# Patient Record
Sex: FEMALE | Race: WHITE | NOT HISPANIC OR LATINO | URBAN - METROPOLITAN AREA
[De-identification: names, ages, dates, MRNs, and addresses within clinical notes are randomized per-mention and may not be internally consistent; named-entity substitution may affect disease eponyms.]

---

## 2017-06-09 ENCOUNTER — APPOINTMENT (OUTPATIENT)
Dept: RADIOLOGY | Facility: MEDICAL CENTER | Age: 32
End: 2017-06-09
Attending: EMERGENCY MEDICINE
Payer: COMMERCIAL

## 2017-06-09 ENCOUNTER — HOSPITAL ENCOUNTER (EMERGENCY)
Facility: MEDICAL CENTER | Age: 32
End: 2017-06-09
Attending: EMERGENCY MEDICINE
Payer: COMMERCIAL

## 2017-06-09 VITALS
HEART RATE: 72 BPM | SYSTOLIC BLOOD PRESSURE: 112 MMHG | DIASTOLIC BLOOD PRESSURE: 67 MMHG | BODY MASS INDEX: 21.97 KG/M2 | HEIGHT: 65 IN | OXYGEN SATURATION: 99 % | WEIGHT: 131.84 LBS | TEMPERATURE: 97.8 F | RESPIRATION RATE: 16 BRPM

## 2017-06-09 DIAGNOSIS — O20.0 ABORTION, THREATENED: ICD-10-CM

## 2017-06-09 LAB
ACTION RH IMMUNE GLOB 8505RHG: NORMAL
APPEARANCE UR: CLEAR
B-HCG SERPL-ACNC: ABNORMAL MIU/ML (ref 0–5)
BASOPHILS # BLD AUTO: 0.2 % (ref 0–1.8)
BASOPHILS # BLD: 0.02 K/UL (ref 0–0.12)
BILIRUB UR QL STRIP.AUTO: NEGATIVE
COLOR UR: YELLOW
EOSINOPHIL # BLD AUTO: 0.11 K/UL (ref 0–0.51)
EOSINOPHIL NFR BLD: 1.3 % (ref 0–6.9)
ERYTHROCYTE [DISTWIDTH] IN BLOOD BY AUTOMATED COUNT: 35.9 FL (ref 35.9–50)
GLUCOSE UR STRIP.AUTO-MCNC: NEGATIVE MG/DL
HCT VFR BLD AUTO: 37.3 % (ref 37–47)
HGB BLD-MCNC: 12.9 G/DL (ref 12–16)
IMM GRANULOCYTES # BLD AUTO: 0.02 K/UL (ref 0–0.11)
IMM GRANULOCYTES NFR BLD AUTO: 0.2 % (ref 0–0.9)
KETONES UR STRIP.AUTO-MCNC: NEGATIVE MG/DL
LEUKOCYTE ESTERASE UR QL STRIP.AUTO: NEGATIVE
LYMPHOCYTES # BLD AUTO: 2.07 K/UL (ref 1–4.8)
LYMPHOCYTES NFR BLD: 24.9 % (ref 22–41)
MCH RBC QN AUTO: 29.8 PG (ref 27–33)
MCHC RBC AUTO-ENTMCNC: 34.6 G/DL (ref 33.6–35)
MCV RBC AUTO: 86.1 FL (ref 81.4–97.8)
MICRO URNS: NORMAL
MONOCYTES # BLD AUTO: 0.68 K/UL (ref 0–0.85)
MONOCYTES NFR BLD AUTO: 8.2 % (ref 0–13.4)
NEUTROPHILS # BLD AUTO: 5.41 K/UL (ref 2–7.15)
NEUTROPHILS NFR BLD: 65.2 % (ref 44–72)
NITRITE UR QL STRIP.AUTO: NEGATIVE
NRBC # BLD AUTO: 0 K/UL
NRBC BLD AUTO-RTO: 0 /100 WBC
NUMBER OF RH DOSES IND 8505RD: 1
PH UR STRIP.AUTO: 6.5 [PH]
PLATELET # BLD AUTO: 277 K/UL (ref 164–446)
PMV BLD AUTO: 9.9 FL (ref 9–12.9)
PROT UR QL STRIP: NEGATIVE MG/DL
RBC # BLD AUTO: 4.33 M/UL (ref 4.2–5.4)
RBC UR QL AUTO: NEGATIVE
RH BLD: NORMAL
SP GR UR STRIP.AUTO: 1.01
WBC # BLD AUTO: 8.3 K/UL (ref 4.8–10.8)

## 2017-06-09 PROCEDURE — 96374 THER/PROPH/DIAG INJ IV PUSH: CPT

## 2017-06-09 PROCEDURE — 36415 COLL VENOUS BLD VENIPUNCTURE: CPT

## 2017-06-09 PROCEDURE — 76817 TRANSVAGINAL US OBSTETRIC: CPT

## 2017-06-09 PROCEDURE — 86901 BLOOD TYPING SEROLOGIC RH(D): CPT

## 2017-06-09 PROCEDURE — 84702 CHORIONIC GONADOTROPIN TEST: CPT

## 2017-06-09 PROCEDURE — 85025 COMPLETE CBC W/AUTO DIFF WBC: CPT

## 2017-06-09 PROCEDURE — 96372 THER/PROPH/DIAG INJ SC/IM: CPT

## 2017-06-09 PROCEDURE — 99284 EMERGENCY DEPT VISIT MOD MDM: CPT

## 2017-06-09 PROCEDURE — 81003 URINALYSIS AUTO W/O SCOPE: CPT

## 2017-06-09 NOTE — ED AVS SNAPSHOT
6/9/2017    Barbara [place] [address]    Dear Barbara:    [facility] wants to ensure your discharge home is safe and you or your loved ones have had all of your questions answered regarding your care after you leave the hospital.    Below is a list of resources and contact information should you have any questions regarding your hospital stay, follow-up instructions, or active medical symptoms.    Questions or Concerns Regarding… Contact   Medical Questions Related to Your Discharge  (7 days a week, 8am-5pm) Contact a Nurse Care Coordinator   [phone]   Medical Questions Not Related to Your Discharge  (24 hours a day / 7 days a week)  Contact the Nurse Health Line   [phone]    Medications or Discharge Instructions Refer to your discharge packet   or contact your [facility] Primary Care Provider   [phone]   Follow-up Appointment(s) Schedule your appointment via [employer]   or contact Scheduling [phone]   Billing Review your statement via [employer]  or contact Billing [phone]   Medical Records Review your records via [employer]   or contact Medical Records [phone]     You may receive a telephone call within two days of discharge. This call is to make certain you understand your discharge instructions and have the opportunity to have any questions answered. You can also easily access your medical information, test results and upcoming appointments via the [employer] free online health management tool. You can learn more and sign up at [email or web address]. For assistance setting up your [employer] account, please call [phone].    Once again, we want to ensure your discharge home is safe and that you have a clear understanding of any next steps in your care. If you have any questions or concerns, please do not hesitate to contact us, we are here for you. Thank you for choosing [facility] for your healthcare needs.    Sincerely,    Your [facility] Healthcare Team

## 2017-06-09 NOTE — ED PROVIDER NOTES
"ED Provider Note    CHIEF COMPLAINT  Chief Complaint   Patient presents with   • Pregnancy     9 weeks,    • Vaginal Bleeding     since last night       [facility]  Barbara Salinas is a 31 y.o. female who presents for evaluation of pregnancy and bleeding.  The patient is  Ab0 whose LMP was in April, though she states it was quite light for normal.  The patient took a pregnancy test at home which was positive.  The patient also had a quantitative beta hCG performed on 5/15/17 of approximately 2250.  The patient states over last couple days she's been having small amounts of vaginal bleeding.  The bleeding is worse after she has bowel movements that she denies any rectal bleeding.  She has had polyuria but no dysuria.  She denies: Fever, chills, URI symptoms, cardiorespiratory symptoms, gastrointestinal symptoms.  No other acute symptomatology or complaints.    REVIEW OF SYSTEMS  See [facility] for further details.  No history of: Hypertension, diabetes, cardiopulmonary disorder, gastrointestinal disorder, gynecological disorders.  She relates a history of asthma as a child but none for several years.  She also has a remote history of mild IBS.  She has lactose intolerance.  All other systems negative.    PAST MEDICAL HISTORY  None    FAMILY HISTORY  No family history on file.    SOCIAL HISTORY  Nonsmoker; denies drug use;    SURGICAL HISTORY  No past surgical history on file.    CURRENT MEDICATIONS  See nurses notes    ALLERGIES  Allergies   Allergen Reactions   • Codeine        PHYSICAL EXAM  VITAL SIGNS: /65 mmHg  Pulse 84  Temp(Src) 36.8 °C (98.2 °F)  Resp 18  Ht 1.651 m (5' 5\")  Wt 59.8 kg (131 lb 13.4 oz)  BMI 21.94 kg/m2  SpO2 99%   Constitutional: A 31-year-old female, Well developed, Well nourished, No acute distress, Non-toxic appearance.   HENT: ,Atraumatic, Bilateral external ears normal, tympanic membranes clear, Oropharynx moist, No oral exudates, Nose normal.   Eyes: PERRL, EOMI, Conjunctiva normal, " No discharge.   Neck: Normal range of motion, No tenderness, Supple, No stridor.   Lymphatic: No lymphadenopathy noted.   Cardiovascular: Normal heart rate, Normal rhythm, No murmurs, No rubs, No gallops.   Thorax & Lungs: Normal Equal breath sounds, No respiratory distress, No wheezing, no stridor, no rales. No chest tenderness.   Abdomen: Soft, nontender, nondistended, no organomegaly, positive bowel sounds normal in quality. No guarding or rebound.  Skin: Good skin turgor, pink, warm, dry. No rashes, petechiae, purpura. Normal capillary refill.   Back: No tenderness, No CVA tenderness.   Genitalia: Performed with a female nurse escorted to bedside; external genitalia is normal; vaginal vault is clear except for a scant amount of blood; cervical os shows a scant amount of blood in the cervix; there is a small lesion at 12:00 on the cervix where she had previous biopsy in February; the adnexa is nontender without masses or fullness; uterus is minimally enlarged; no cervical motion tenderness;  Extremities: Intact distal pulses, No edema, No tenderness, No cyanosis, No clubbing. Vascular: Pulses are 2+, symmetric in the upper and lower extremities.  Neurologic: Alert & oriented x 3, Normal motor function, Normal sensory function, No gross focal deficits noted.   Psychiatric: Affect normal, Judgment normal, Mood normal.     RADIOLOGY/PROCEDURES  US-OB PELVIS TRANSVAGINAL   Final Result         1.  Enlarged slightly misshapen gestational sac with small fetal pole with decreased cardiac activity. These findings are suspicious for missed .            COURSE & MEDICAL DECISION MAKING  Pertinent Labs & Imaging studies reviewed. (See chart for details)  1.  RhoGAM IM    Laboratory studies: CBC and differential within normal; urinalysis is negative; Rh is negative; quantitative hCG is 61,912; urinalysis is negative;     Discussion: At this time, the patient presents for evaluation of pregnancy and bleeding.  The  patient has findings consistent with threatened .  The patient's quantitative beta hCG has increased since her last lab test on 5/15/17.  However, ultrasonography shows some irregularities to the gestational sac with a low heart rate suggesting the possibility of missed .  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  At this time, I will have the patient get a repeat quantitative hCG in 3 days.  She has been given follow-up with ObGyn on-call.  The patient was given precautions and instructions to get rechecked if change worsening symptoms or increased bleeding or pain.  She is Rh- and was given RhoGam.  The patient indicates she is comfortable with this explanation and disposition.    FINAL IMPRESSION  1. , threatened        PLAN  1.  Appropriate discharge instructions given  2.  Follow-up with ObGyn  3.  Return if change or worsening of symptoms, as discussed    Electronically signed by: Guy G Gansert, 2017 3:32 PM

## 2017-06-09 NOTE — ED AVS SNAPSHOT
Home Care Instructions                                                                                                                Barbara Salinas   MRN: [id number]    Department:  [facility], Emergency Dept   Date of Visit:  2017            [facility], Emergency Dept    [address]    [place] [address]    Phone:  [phone]      You were seen by     Guy G Gansert, M.D.      Your Diagnosis Was     , threatened     O20.0       These are the medications you received during your hospitalization from 2017 1433 to 2017 1845     Date/Time Order Dose Route Action    2017 1842 Rho D Immune Globulin (RHOPHYLAC) injection 1,500 Units Intravenous Given      Follow-up Information     1. Follow up with Chelsie Rubio M.D..    Specialty:  OB/Gyn    Contact information    [address]  [place] [address] [phone]        Medication Information     Review all of your home medications and newly ordered medications with your primary doctor and/or pharmacist as soon as possible. Follow medication instructions as directed by your doctor and/or pharmacist.     Please keep your complete medication list with you and share with your physician. Update the information when medications are discontinued, doses are changed, or new medications (including over-the-counter products) are added; and carry medication information at all times in the event of emergency situations.               Medication List      Notice     You have not been prescribed any medications.            Procedures and tests performed during your visit     ACTION: RH IMMUNE GLOBULIN    CBC WITH DIFFERENTIAL    HCG QUANTITATIVE SERUM    NURSING COMMUNICATION    RH TYPE FOR RHOGAM FROM TOM    URINALYSIS (UA)    US-OB PELVIS TRANSVAGINAL        Discharge Instructions       Vaginal Bleeding During Pregnancy, First Trimester  A small amount of bleeding (spotting) from the vagina is  relatively common in early pregnancy. It usually stops on its own. Various things may cause bleeding or spotting in early pregnancy. Some bleeding may be related to the pregnancy, and some may not. In most cases, the bleeding is normal and is not a problem. However, bleeding can also be a sign of something serious. Be sure to tell your health care provider about any vaginal bleeding right away.  Some possible causes of vaginal bleeding during the first trimester include:  · Infection or inflammation of the cervix.  · Growths (polyps) on the cervix.  · Miscarriage or threatened miscarriage.  · Pregnancy tissue has developed outside of the uterus and in a fallopian tube (tubal pregnancy).  · Tiny cysts have developed in the uterus instead of pregnancy tissue (molar pregnancy).  HOME CARE INSTRUCTIONS   Watch your condition for any changes. The following actions may help to lessen any discomfort you are feeling:  · Follow your health care provider's instructions for limiting your activity. If your health care provider orders bed rest, you may need to stay in bed and only get up to use the bathroom. However, your health care provider may allow you to continue light activity.  · If needed, make plans for someone to help with your regular activities and responsibilities while you are on bed rest.  · Keep track of the number of pads you use each day, how often you change pads, and how soaked (saturated) they are. Write this down.  · Do not use tampons. Do not douche.  · Do not have sexual intercourse or orgasms until approved by your health care provider.  · If you pass any tissue from your vagina, save the tissue so you can show it to your health care provider.  · Only take over-the-counter or prescription medicines as directed by your health care provider.  · Do not take aspirin because it can make you bleed.  · Keep all follow-up appointments as directed by your health care provider.  SEEK MEDICAL CARE IF:  · You have  any vaginal bleeding during any part of your pregnancy.  · You have cramps or labor pains.  · You have a fever, not controlled by medicine.  SEEK IMMEDIATE MEDICAL CARE IF:   · You have severe cramps in your back or belly (abdomen).  · You pass large clots or tissue from your vagina.  · Your bleeding increases.  · You feel light-headed or weak, or you have fainting episodes.  · You have chills.  · You are leaking fluid or have a gush of fluid from your vagina.  · You pass out while having a bowel movement.  MAKE SURE YOU:  · Understand these instructions.  · Will watch your condition.  · Will get help right away if you are not doing well or get worse.     This information is not intended to replace advice given to you by your health care provider. Make sure you discuss any questions you have with your health care provider.     Document Released: 09/27/2006 Document Revised: 12/23/2014 Document Reviewed: 08/25/2014  [employer] Patient Education ©2016 Elsevier Inc.    Threatened Miscarriage  A threatened miscarriage is when you have vaginal bleeding during your first 20 weeks of pregnancy but the pregnancy has not ended. Your doctor will do tests to make sure you are still pregnant. The cause of the bleeding may not be known. This condition does not mean your pregnancy will end. It does increase the risk of it ending (complete miscarriage).  HOME CARE   · Make sure you keep all your doctor visits for prenatal care.  · Get plenty of rest.  · Do not have sex or use tampons if you have vaginal bleeding.  · Do not douche.  · Do not smoke or use drugs.  · Do not drink alcohol.  · Avoid caffeine.  GET HELP IF:  · You have light bleeding from your vagina.  · You have belly pain or cramping.  · You have a fever.  GET HELP RIGHT AWAY IF:   · You have heavy bleeding from your vagina.  · You have clots of blood coming from your vagina.  · You have bad pain or cramps in your low back or belly.  · You have fever, chills,  and bad belly pain.  MAKE SURE YOU:   · Understand these instructions.  · Will watch your condition.  · Will get help right away if you are not doing well or get worse.     This information is not intended to replace advice given to you by your health care provider. Make sure you discuss any questions you have with your health care provider.     Document Released: 2009 Document Revised: 2014 Document Reviewed: 10/14/2014  [employer] Patient Education © Elsevier Inc.    Pelvic Rest  Pelvic rest is sometimes recommended for women when:   · The placenta is partially or completely covering the opening of the cervix (placenta previa).  · There is bleeding between the uterine wall and the amniotic sac in the first trimester (subchorionic hemorrhage).  · The cervix begins to open without labor starting (incompetent cervix, cervical insufficiency).  · The labor is too early ( labor).  HOME CARE INSTRUCTIONS  · Do not have sexual intercourse, stimulation, or an orgasm.  · Do not use tampons, douche, or put anything in the vagina.  · Do not lift anything over 10 pounds (4.5 kg).  · Avoid strenuous activity or straining your pelvic muscles.  SEEK MEDICAL CARE IF:   · You have any vaginal bleeding during pregnancy. Treat this as a potential emergency.  · You have cramping pain felt low in the stomach (stronger than menstrual cramps).  · You notice vaginal discharge (watery, mucus, or bloody).  · You have a low, dull backache.  · There are regular contractions or uterine tightening.  SEEK IMMEDIATE MEDICAL CARE IF:  You have vaginal bleeding and have placenta previa.      This information is not intended to replace advice given to you by your health care provider. Make sure you discuss any questions you have with your health care provider.     Document Released: 2012 Document Revised: 2013 Document Reviewed: 2012  [employer] Patient Education © Elsevier Inc.              Patient Information     Patient Information    Following emergency treatment: all patient requiring follow-up care must return either to a private physician or a clinic if your condition worsens before you are able to obtain further medical attention, please return to the emergency room.     Billing Information    At [facility], we work to make the billing process streamlined for our patients.  Our Representatives are here to answer any questions you may have regarding your hospital bill.  If you have insurance coverage and have supplied your insurance information to us, we will submit a claim to your insurer on your behalf.  Should you have any questions regarding your bill, we can be reached online or by phone as follows:  Online: You are able pay your bills online or live chat with our representatives about any billing questions you may have. We are here to help Monday - Friday from 8:00am to 7:30pm and 9:00am - 12:00pm on Saturdays.  Please visit [email or web address]  for more information.   Phone:  [phone] or [phone]    Please note that your emergency physician, surgeon, pathologist, radiologist, anesthesiologist, and other specialists are not employed by [facility] and will therefore bill separately for their services.  Please contact them directly for any questions concerning their bills at the numbers below:     Emergency Physician Services:  [phone]  [facility]:  [phone]  [facility]:  [phone]  [facility]:  [phone]    1. Your final bill may vary from the amount quoted upon discharge if all procedures are not complete at that time, or if your doctor has additional procedures of which we are not aware. You will receive an additional bill if you return to the Emergency Department at [facility] for suture removal regardless of the facility of which the sutures were placed.     2. Please  arrange for settlement of this account at the emergency registration.    3. All self-pay accounts are due in full at the time of treatment.  If you are unable to meet this obligation then payment is expected within 4-5 days.     4. If you have had radiology studies (CT, X-ray, Ultrasound, MRI), you have received a preliminary result during your emergency department visit. Please contact the radiology department [phone] to receive a copy of your final result. Please discuss the Final result with your primary physician or with the follow up physician provided.     Crisis Hotline:  [place] Crisis Hotline:  4-142-HALTQIY or 1-422.549.8186  [place] Crisis Hotline:    1-442.577.1742 or 967-101-0134         ED Discharge Follow Up Questions    1. In order to provide you with very good care, we would like to follow up with a phone call in the next few days.  May we have your permission to contact you?     YES /  NO    2. What is the best phone number to call you? (       )_____-__________    3. What is the best time to call you?      Morning  /  Afternoon  /  Evening                   Patient Signature:  ____________________________________________________________    Date:  ____________________________________________________________

## 2017-06-09 NOTE — ED NOTES
Chief Complaint   Patient presents with   • Pregnancy     9 weeks,    • Vaginal Bleeding     since last night     Ambulatory to triage for above. VSS. NAD. Explained triage process, to waiting room. Asked to inform RN if questions or concerns arise.

## 2017-06-09 NOTE — ED NOTES
Blood drawn and sent to lab. Pt ambulated up to bathroom to provide urine sample. US at bedside now.

## 2017-06-09 NOTE — ED AVS SNAPSHOT
[employer] Access Code: [email or web address]  Expires: 7/9/2017  6:45 PM    [employer]  A secure, online tool to manage your health information     [employer]’s [employer]® is a secure, online tool that connects you to your personalized health information from the privacy of your home -- day or night - making it very easy for you to manage your healthcare. Once the activation process is completed, you can even access your medical information using the [employer] nadege, which is available for free in the Apple Nadege store or Google Play store.     [employer] provides the following levels of access (as shown below):   My Chart Features   [facility] Primary Care Doctor [facility]  Specialists [facility]  Urgent  Care Non-[facility]  Primary Care  Doctor   Email your healthcare team securely and privately 24/7 X X X X   Manage appointments: schedule your next appointment; view details of past/upcoming appointments X      Request prescription refills. X      View recent personal medical records, including lab and immunizations X X X X   View health record, including health history, allergies, medications X X X X   Read reports about your outpatient visits, procedures, consult and ER notes X X X X   See your discharge summary, which is a recap of your hospital and/or ER visit that includes your diagnosis, lab results, and care plan. X X       How to register for [employer]:  1. Go to  [email or web address].  2. Click on the Sign Up Now box, which takes you to the New Member Sign Up page. You will need to provide the following information:  a. Enter your [employer] Access Code exactly as it appears at the top of this page. (You will not need to use this code after you’ve completed the sign-up process. If you do not sign up before the expiration date, you must request a new code.)   b. Enter your date of birth.   c. Enter your home email address.   d. Click Submit, and follow the next screen’s instructions.  3. Create a [employer] ID. This will be your [employer]  login ID and cannot be changed, so think of one that is secure and easy to remember.  4. Create a [employer] password. You can change your password at any time.  5. Enter your Password Reset Question and Answer. This can be used at a later time if you forget your password.   6. Enter your e-mail address. This allows you to receive e-mail notifications when new information is available in [employer].  7. Click Sign Up. You can now view your health information.    For assistance activating your [employer] account, call [phone]

## 2017-06-10 NOTE — DISCHARGE INSTRUCTIONS
Vaginal Bleeding During Pregnancy, First Trimester  A small amount of bleeding (spotting) from the vagina is relatively common in early pregnancy. It usually stops on its own. Various things may cause bleeding or spotting in early pregnancy. Some bleeding may be related to the pregnancy, and some may not. In most cases, the bleeding is normal and is not a problem. However, bleeding can also be a sign of something serious. Be sure to tell your health care provider about any vaginal bleeding right away.  Some possible causes of vaginal bleeding during the first trimester include:  · Infection or inflammation of the cervix.  · Growths (polyps) on the cervix.  · Miscarriage or threatened miscarriage.  · Pregnancy tissue has developed outside of the uterus and in a fallopian tube (tubal pregnancy).  · Tiny cysts have developed in the uterus instead of pregnancy tissue (molar pregnancy).  HOME CARE INSTRUCTIONS   Watch your condition for any changes. The following actions may help to lessen any discomfort you are feeling:  · Follow your health care provider's instructions for limiting your activity. If your health care provider orders bed rest, you may need to stay in bed and only get up to use the bathroom. However, your health care provider may allow you to continue light activity.  · If needed, make plans for someone to help with your regular activities and responsibilities while you are on bed rest.  · Keep track of the number of pads you use each day, how often you change pads, and how soaked (saturated) they are. Write this down.  · Do not use tampons. Do not douche.  · Do not have sexual intercourse or orgasms until approved by your health care provider.  · If you pass any tissue from your vagina, save the tissue so you can show it to your health care provider.  · Only take over-the-counter or prescription medicines as directed by your health care provider.  · Do not take aspirin because it can make you  bleed.  · Keep all follow-up appointments as directed by your health care provider.  SEEK MEDICAL CARE IF:  · You have any vaginal bleeding during any part of your pregnancy.  · You have cramps or labor pains.  · You have a fever, not controlled by medicine.  SEEK IMMEDIATE MEDICAL CARE IF:   · You have severe cramps in your back or belly (abdomen).  · You pass large clots or tissue from your vagina.  · Your bleeding increases.  · You feel light-headed or weak, or you have fainting episodes.  · You have chills.  · You are leaking fluid or have a gush of fluid from your vagina.  · You pass out while having a bowel movement.  MAKE SURE YOU:  · Understand these instructions.  · Will watch your condition.  · Will get help right away if you are not doing well or get worse.     This information is not intended to replace advice given to you by your health care provider. Make sure you discuss any questions you have with your health care provider.     Document Released: 09/27/2006 Document Revised: 12/23/2014 Document Reviewed: 08/25/2014  [employer] Interactive Patient Education ©2016 [employer]    Threatened Miscarriage  A threatened miscarriage is when you have vaginal bleeding during your first 20 weeks of pregnancy but the pregnancy has not ended. Your doctor will do tests to make sure you are still pregnant. The cause of the bleeding may not be known. This condition does not mean your pregnancy will end. It does increase the risk of it ending (complete miscarriage).  HOME CARE   · Make sure you keep all your doctor visits for prenatal care.  · Get plenty of rest.  · Do not have sex or use tampons if you have vaginal bleeding.  · Do not douche.  · Do not smoke or use drugs.  · Do not drink alcohol.  · Avoid caffeine.  GET HELP IF:  · You have light bleeding from your vagina.  · You have belly pain or cramping.  · You have a fever.  GET HELP RIGHT AWAY IF:   · You have heavy bleeding from your vagina.  · You have clots  of blood coming from your vagina.  · You have bad pain or cramps in your low back or belly.  · You have fever, chills, and bad belly pain.  MAKE SURE YOU:   · Understand these instructions.  · Will watch your condition.  · Will get help right away if you are not doing well or get worse.     This information is not intended to replace advice given to you by your health care provider. Make sure you discuss any questions you have with your health care provider.     Document Released: 2009 Document Revised: 2014 Document Reviewed: 10/14/2014  [employer] Interactive Patient Education ©6 [employer]    Pelvic Rest  Pelvic rest is sometimes recommended for women when:   · The placenta is partially or completely covering the opening of the cervix (placenta previa).  · There is bleeding between the uterine wall and the amniotic sac in the first trimester (subchorionic hemorrhage).  · The cervix begins to open without labor starting (incompetent cervix, cervical insufficiency).  · The labor is too early ( labor).  HOME CARE INSTRUCTIONS  · Do not have sexual intercourse, stimulation, or an orgasm.  · Do not use tampons, douche, or put anything in the vagina.  · Do not lift anything over 10 pounds (4.5 kg).  · Avoid strenuous activity or straining your pelvic muscles.  SEEK MEDICAL CARE IF:   · You have any vaginal bleeding during pregnancy. Treat this as a potential emergency.  · You have cramping pain felt low in the stomach (stronger than menstrual cramps).  · You notice vaginal discharge (watery, mucus, or bloody).  · You have a low, dull backache.  · There are regular contractions or uterine tightening.  SEEK IMMEDIATE MEDICAL CARE IF:  You have vaginal bleeding and have placenta previa.      This information is not intended to replace advice given to you by your health care provider. Make sure you discuss any questions you have with your health care provider.     Document Released: 2012  Document Revised: 03/11/2013 Document Reviewed: 04/13/2012  [employer] Interactive Patient Education ©2016 [employer]

## 2017-06-10 NOTE — ED NOTES
Pt given discharge instructions. Pt verbalized understanding. RN to answer any questions pt and spouse had. Pt instructed on repeat lab work, follow up care, and sx to return to ER for. VSS. [facility]

## 2018-05-27 ENCOUNTER — OFFICE VISIT (OUTPATIENT)
Dept: URGENT CARE | Facility: CLINIC | Age: 33
End: 2018-05-27

## 2018-05-27 VITALS
RESPIRATION RATE: 16 BRPM | BODY MASS INDEX: 20.46 KG/M2 | WEIGHT: 135 LBS | SYSTOLIC BLOOD PRESSURE: 100 MMHG | OXYGEN SATURATION: 100 % | TEMPERATURE: 97.9 F | HEART RATE: 77 BPM | DIASTOLIC BLOOD PRESSURE: 60 MMHG | HEIGHT: 68 IN

## 2018-05-27 DIAGNOSIS — J98.8 RTI (RESPIRATORY TRACT INFECTION): ICD-10-CM

## 2018-05-27 PROCEDURE — 99213 OFFICE O/P EST LOW 20 MIN: CPT | Performed by: FAMILY MEDICINE

## 2018-05-27 RX ORDER — AMOXICILLIN 875 MG/1
875 TABLET, COATED ORAL EVERY 12 HOURS
Qty: 20 TAB | Refills: 0 | Status: SHIPPED | OUTPATIENT
Start: 2018-05-27 | End: 2018-06-06

## 2018-05-27 RX ORDER — ALBUTEROL SULFATE 90 UG/1
2 AEROSOL, METERED RESPIRATORY (INHALATION) EVERY 6 HOURS PRN
Qty: 8.5 G | Refills: 3 | Status: SHIPPED | OUTPATIENT
Start: 2018-05-27

## 2018-05-27 RX ORDER — PREDNISONE 20 MG/1
40 TABLET ORAL EVERY MORNING
Qty: 12 TAB | Refills: 0 | Status: SHIPPED | OUTPATIENT
Start: 2018-05-27 | End: 2018-06-02

## 2018-05-27 ASSESSMENT — ENCOUNTER SYMPTOMS
COUGH: 1
SORE THROAT: 1
DIZZINESS: 0
FEVER: 0
CHILLS: 0
SINUS PAIN: 1
SPUTUM PRODUCTION: 0
ORTHOPNEA: 0
FOCAL WEAKNESS: 0

## 2018-05-27 NOTE — PROGRESS NOTES
"Subjective:      Barbara Salinas is a 32 y.o. female who presents with Cough (x 3 weeks on and of, x 2 days ago hard time breathing, dryness in throat, mid throat pain feeling tachycardic at times)    Chief Complaint   Patient presents with   • Cough     x 3 weeks on and of, x 2 days ago hard time breathing, dryness in throat, mid throat pain feeling tachycardic at times        - This is a very pleasant 32 y.o. female with complaints of 3 wks on/off cough sore throat. Feels tight in chest when breathing in at times, no NVFC          ALLERGIES:  Codeine     PMH:  History reviewed. No pertinent past medical history.     MEDS:    Current Outpatient Prescriptions:   •  amoxicillin (AMOXIL) 875 MG tablet, Take 1 Tab by mouth every 12 hours for 10 days., Disp: 20 Tab, Rfl: 0  •  albuterol 108 (90 Base) MCG/ACT Aero Soln inhalation aerosol, Inhale 2 Puffs by mouth every 6 hours as needed for Shortness of Breath., Disp: 8.5 g, Rfl: 3  •  predniSONE (DELTASONE) 20 MG Tab, Take 2 Tabs by mouth every morning for 6 days., Disp: 12 Tab, Rfl: 0    ** I have documented what I find to be significant in regards to past medical, social, family and surgical history  in my HPI or under PMH/PSH/FH review section, otherwise it is contributory **           HPI    Review of Systems   Constitutional: Negative for chills and fever.   HENT: Positive for congestion, sinus pain and sore throat.    Respiratory: Positive for cough. Negative for sputum production.    Cardiovascular: Negative for chest pain and orthopnea.   Neurological: Negative for dizziness and focal weakness.          Objective:     /60   Pulse 77   Temp 36.6 °C (97.9 °F)   Resp 16   Ht 1.727 m (5' 8\")   Wt 61.2 kg (135 lb)   SpO2 100%   BMI 20.53 kg/m²      Physical Exam   Constitutional: She appears well-developed. No distress.   HENT:   Head: Normocephalic and atraumatic.   Mouth/Throat: Oropharynx is clear and moist.   Eyes: Conjunctivae are normal.   Neck: Neck " supple.   Cardiovascular: Regular rhythm.    No murmur heard.  Pulmonary/Chest: Effort normal and breath sounds normal. No respiratory distress.   Neurological: She is alert. She exhibits normal muscle tone.   Skin: Skin is warm and dry.   Psychiatric: She has a normal mood and affect. Judgment normal.   Nursing note and vitals reviewed.              Assessment/Plan:         1. RTI (respiratory tract infection)  amoxicillin (AMOXIL) 875 MG tablet    albuterol 108 (90 Base) MCG/ACT Aero Soln inhalation aerosol    predniSONE (DELTASONE) 20 MG Tab             Dx & d/c instructions discussed w/ patient and/or family members. Follow up w/ Prvt Dr or here in 3-4 days if not getting better, sooner if needed,  ER if worse and UC/PCP unavailable.        Possible side effects (i.e. Rash, GI upset/constipation, sedation, elevation of BP or sugars) of any medications given discussed.